# Patient Record
Sex: FEMALE | Race: WHITE | NOT HISPANIC OR LATINO | Employment: FULL TIME
[De-identification: names, ages, dates, MRNs, and addresses within clinical notes are randomized per-mention and may not be internally consistent; named-entity substitution may affect disease eponyms.]

---

## 2017-11-19 ENCOUNTER — HEALTH MAINTENANCE LETTER (OUTPATIENT)
Age: 22
End: 2017-11-19

## 2020-03-02 ENCOUNTER — HEALTH MAINTENANCE LETTER (OUTPATIENT)
Age: 25
End: 2020-03-02

## 2020-12-14 ENCOUNTER — HEALTH MAINTENANCE LETTER (OUTPATIENT)
Age: 25
End: 2020-12-14

## 2021-04-18 ENCOUNTER — HEALTH MAINTENANCE LETTER (OUTPATIENT)
Age: 26
End: 2021-04-18

## 2021-10-02 ENCOUNTER — HEALTH MAINTENANCE LETTER (OUTPATIENT)
Age: 26
End: 2021-10-02

## 2022-05-14 ENCOUNTER — HEALTH MAINTENANCE LETTER (OUTPATIENT)
Age: 27
End: 2022-05-14

## 2022-09-03 ENCOUNTER — HEALTH MAINTENANCE LETTER (OUTPATIENT)
Age: 27
End: 2022-09-03

## 2023-06-03 ENCOUNTER — HEALTH MAINTENANCE LETTER (OUTPATIENT)
Age: 28
End: 2023-06-03

## 2023-07-07 ENCOUNTER — TELEPHONE (OUTPATIENT)
Dept: BEHAVIORAL HEALTH | Facility: CLINIC | Age: 28
End: 2023-07-07
Payer: COMMERCIAL

## 2023-07-07 ENCOUNTER — TELEPHONE (OUTPATIENT)
Dept: BEHAVIORAL HEALTH | Facility: CLINIC | Age: 28
End: 2023-07-07

## 2023-07-07 ENCOUNTER — HOSPITAL ENCOUNTER (EMERGENCY)
Facility: CLINIC | Age: 28
Discharge: HOME | End: 2023-07-07
Attending: EMERGENCY MEDICINE | Admitting: STUDENT IN AN ORGANIZED HEALTH CARE EDUCATION/TRAINING PROGRAM
Payer: COMMERCIAL

## 2023-07-07 VITALS
DIASTOLIC BLOOD PRESSURE: 74 MMHG | OXYGEN SATURATION: 99 % | RESPIRATION RATE: 18 BRPM | SYSTOLIC BLOOD PRESSURE: 113 MMHG | TEMPERATURE: 98.7 F | HEART RATE: 75 BPM

## 2023-07-07 DIAGNOSIS — F41.0 GENERALIZED ANXIETY DISORDER WITH PANIC ATTACKS: ICD-10-CM

## 2023-07-07 DIAGNOSIS — F41.1 GENERALIZED ANXIETY DISORDER WITH PANIC ATTACKS: ICD-10-CM

## 2023-07-07 LAB
AMPHETAMINES UR QL SCN: ABNORMAL
BARBITURATES UR QL SCN: ABNORMAL
BENZODIAZ UR QL SCN: ABNORMAL
BZE UR QL SCN: ABNORMAL
CANNABINOIDS UR QL SCN: ABNORMAL
HCG UR QL: NEGATIVE
OPIATES UR QL SCN: ABNORMAL

## 2023-07-07 PROCEDURE — 99285 EMERGENCY DEPT VISIT HI MDM: CPT | Mod: 25 | Performed by: STUDENT IN AN ORGANIZED HEALTH CARE EDUCATION/TRAINING PROGRAM

## 2023-07-07 PROCEDURE — 99284 EMERGENCY DEPT VISIT MOD MDM: CPT | Performed by: STUDENT IN AN ORGANIZED HEALTH CARE EDUCATION/TRAINING PROGRAM

## 2023-07-07 PROCEDURE — 80307 DRUG TEST PRSMV CHEM ANLYZR: CPT | Performed by: STUDENT IN AN ORGANIZED HEALTH CARE EDUCATION/TRAINING PROGRAM

## 2023-07-07 PROCEDURE — 250N000013 HC RX MED GY IP 250 OP 250 PS 637: Performed by: STUDENT IN AN ORGANIZED HEALTH CARE EDUCATION/TRAINING PROGRAM

## 2023-07-07 PROCEDURE — 90791 PSYCH DIAGNOSTIC EVALUATION: CPT

## 2023-07-07 PROCEDURE — 81025 URINE PREGNANCY TEST: CPT | Performed by: STUDENT IN AN ORGANIZED HEALTH CARE EDUCATION/TRAINING PROGRAM

## 2023-07-07 RX ORDER — HYDROXYZINE HYDROCHLORIDE 25 MG/1
25 TABLET, FILM COATED ORAL 3 TIMES DAILY PRN
Qty: 42 TABLET | Refills: 0 | Status: SHIPPED | OUTPATIENT
Start: 2023-07-07 | End: 2023-07-21

## 2023-07-07 RX ORDER — HYDROXYZINE HYDROCHLORIDE 25 MG/1
25 TABLET, FILM COATED ORAL ONCE
Status: COMPLETED | OUTPATIENT
Start: 2023-07-07 | End: 2023-07-07

## 2023-07-07 RX ADMIN — HYDROXYZINE HYDROCHLORIDE 25 MG: 25 TABLET, FILM COATED ORAL at 11:14

## 2023-07-07 ASSESSMENT — COLUMBIA-SUICIDE SEVERITY RATING SCALE - C-SSRS
6. HAVE YOU EVER DONE ANYTHING, STARTED TO DO ANYTHING, OR PREPARED TO DO ANYTHING TO END YOUR LIFE?: NO
2. HAVE YOU ACTUALLY HAD ANY THOUGHTS OF KILLING YOURSELF?: NO
ATTEMPT LIFETIME: NO
TOTAL  NUMBER OF ABORTED OR SELF INTERRUPTED ATTEMPTS LIFETIME: NO
TOTAL  NUMBER OF INTERRUPTED ATTEMPTS LIFETIME: NO
1. HAVE YOU WISHED YOU WERE DEAD OR WISHED YOU COULD GO TO SLEEP AND NOT WAKE UP?: NO

## 2023-07-07 ASSESSMENT — ACTIVITIES OF DAILY LIVING (ADL)
ADLS_ACUITY_SCORE: 35
ADLS_ACUITY_SCORE: 33

## 2023-07-07 NOTE — TELEPHONE ENCOUNTER
Mental Health &Addiction (MH&A)Transition Clinic (TC):     Provides Patient Support While Waiting to Access Programmatic and Outpatient MH&A Care and Provides Select Crisis Assessment Services     NURSING Referral Review  _________________________________________    This RN has reviewed this Medication Management referral to the Transition Clinic and deemed the referral   [x] Appropriate x(Tier 1)  [] Inappropriate   []Consulting     Based on the following criteria:    Pt has a psychiatric provider (or pending plan) in place for future prescribing: Yes:   Next Level of Care Patient Will Be Transitioned To: telepsychiatry   Provider(s)Landry Echavarria  MSN  CNP,RN   Location [facility] [address], [address]   Date/Time 7/12/2023 3:00 pm        Timeframe until pt's scheduled psychiatry appointment is less than 6 months: Yes: 6 days     Pt takes psychiatric medications: Yes:   Current Medications       Escitalopram Oxalate (LEXAPRO PO) Take 15 mg by mouth daily  hydrOXYzine (ATARAX) 25 MG tablet Take 1 tablet (25 mg) by mouth 3 times daily as needed for anxiety      Pt's goals seem to align with this temporary service: Yes: Transition Clinic to bridge psychiatric care and psychiatric medication management until next Level of Care.         Any additional pertinent information regarding this referral: Patient was seen in the ED on 7/7/23. Per ED HPI. HPI  Camilo Anders is a 28 year old female with a past medical history of generalized anxiety disorder who presents to the Emergency Department with her sister seeking evaluation of a panic attack. Patient notes that she recently increased her Lexapro dosage from 10 to 15 mg at the end of April, and has decompensated since then.      The following report was obtained after having spoken to [facility] . Please see [facility] Crisis Assessment on July 7, 2023 in Epic for further details.     The only psych medication she has been on consistently is  Lexapro. She has been on antianxiety medications in the past, but has abused them, and so she no longer fills her Rx for them. Otherwise, she smokes marijuana on occasion. She is unsure of what has been triggering her panic attacks. She recently began seeing a therapist. She has had anxiety/depression ever since she was young. She is unsure of what has been triggering her panic attacks. She reports experiencing low motivation, sleeping excessively, being nauseated, and low appetite secondary to her mental health afflictions. She states that she feels like a burden to her boyfriend and roommate. Her roommate and boyfriend seems to lack a nuanced understanding of mental health. No SI/HI, psychosis, or SIB. Her sister has no safety concerns for her. The patient would be interested in outpatient follow up, as well as medication management. They are both agreeable with discharge at this time.     Initial contact w/ patient/parent: TC Coordinator to contact this patient/patients guardian to schedule a New Person Visit with TC Provider Tiara Echavarria. patient to prioritize next level of care appointment as it is in 6 days.    Additional Scheduling Instructions for Transition Clinic Coordinator:   TC Coordinators:  This is a Medication Management only Referral.        Please call the patient at [phone] (home). Please schedule a NewPerson appointment with TC Provider Tiara Echavarria as soon as possible or as indicated by the patient.       TC Coordinator, please inform this (patient/ parent/guardian/facility staff member) as to the purpose and benefit of the TC.      The Transition Clinic is a Temporary Service that helps to bridge the time to your next appointment.  It is not intended to be a long-term service and you are expected to attend your scheduled appointment with your next provider.      Patient/Parent/ Facility Staff Member verbalized understanding     If you need support between appointments, please call  [phone] and let them know you're seen by Transition Clinic Psychiatry.  You may also send a [employer] message to reach us.          RN Signature Vitaly Medina, RN on 7/7/2023 at 2:53 PM        FW: TC Referral  Received: Today  [facility] Transition Clinic Rn Pool  Hello,     Med only-     Next Level of Care Patient Will Be Transitioned To: telepsychiatry   Provider(s)Landry Echavarria  MSN  CNP,RN   Location [facility] [address], [address]   Date/Time 7/12/2023 3:00 pm       Thank you!           Previous Messages       ----- Message -----   From: Ethel Rivera   Sent: 7/7/2023  12:21 PM CDT   To: Transition Clinic   Subject: TC Referral                                       Transition Clinic Ref[place]   [place]         Please Check Type of Referral Requested:       ____THERAPY: The Transition clinic is able to schedule patients without current medical insurance; these patient will be referred to our Social Work Care Coordinator for Medical Insurance              Assistance. We are open for referral for psychotherapy. Patient is referred from:  Extended Care       __X__MEDICATION:  Referrals for Medication are ONLY accepted from the following areas (select): Emergency Department/Urgent Care - HIGH PRIORITY                                       Suboxone and Opioid Management Referrals are automatically denied. TC Psychiatry cannot see patient without active medical insurance.   TC Psychiatry cannot accept patient with next level of care scheduled with PCP       GUARDIAN: If your patient is not their own Guardian, please provide the following:     Guardian Name:   Guardian Contact Information (Phone & Email) :   Guardian Address:     FOSTER CARE PROVIDER: If your patient lives at a Licensed Foster Care, please provide the following:     Foster Provider:   Foster Provider Contact Information (Phone & Email):   Foster Provider Address:         Referring Provider  Contact Name: Darlene Khanna; Phone Number: [phone]     Reason for Transition Clinic Referral: Bridge until seen     Next Level of Care Patient Will Be Transitioned To: telepsychiatry   Provider(s)Landry Echavarria  MSN  CNP,RN   Location [facility] [address] [facility]   Date/Time 7/12/2023 3:00 pm     What Would Be Helpful from the Transition Clinic: Patient would like to be seen as soon as possible.      Needs: NO     Does Patient Have Access to Technology: YES     Patient E-mail Address: [email or web address]     Current Patient Phone Number: [phone]     Clinician Gender Preference (if applicable): YES: female     Patient location preference: [place]

## 2023-07-07 NOTE — DISCHARGE INSTRUCTIONS
[facility] SCHEDULING:  Today you were seen by a licensed mental health professional through [facility] and [facility] ([facility])  for a crisis assessment in the Emergency Department at [facility].  It is recommended that you follow up with your estabished providers (psychiatrist, mental health therapist, and/or primary care doctor - as relevant) as soon as possible. Coordinators from [facility] will be calling you in the next 24-48 hours to ensure that you have the resources you need.  You can also contact [facility] coordinators directly at [phone].    You have been scheduled the following appointments:  IN-PERSON ADULT MH EVAL with Camilo Cohen on 07/18/2023 at 1:00PM    VIRTUAL PSYCHIATRY  Landry Echavarria  MSN  CNP,RN    [facility] [address], [address] [phone]   7/12/2023 3:00 pm      [facility] maintains an extensive network of licensed behavioral health providers to connect patients with the services they need.  We do not charge providers a fee to participate in our referral network.  We match patients with providers based on a patient s specific needs, insurance coverage, and location.  Our first effort will be to refer you to a provider within your care system, and will utilize providers outside your care system as needed.     We are making a referral to the transition clinic for bridging telemedicine appointments (med management and/or therapy) until your scheduled appointment.  They will be reaching out to you via e-mail or phone in the coming days.  They can be reached at [phone].            Aftercare Plan    If I am feeling unsafe or I am in a crisis, I will:   Contact my established care providers   Call the National Suicide Prevention Lifeline: 988  Go to the nearest emergency room   Call 911     Warning signs that I or other people might notice when a crisis is developing for me: worsening depression or anxiety    Things I am able to do on  my own to cope or help me feel better: Grounding Techniques:  Try to notice where you are, your surroundings including the people, the sounds like the TV or radio.  Concentrate on your breathing. Take a deep cleansing breath from your diaphragm. Count the breaths as you exhale. Make sure you breath slowly.  Hold something that you find comforting, for some it may be a stuffed animal or a blanket. Notice how it feels in your hands. Is it hard or soft?  During a non-crisis time make a list of positive affirmations. Print them out and keep them handy for times of intense anxiety. At those times, read them aloud.  Try the TV2 Holding game:  Name 5 things you can see in the room with you  Name 4 things you can feel ( chair on my back  or  feet on floor )   Name 3 things you can hear right now ( people talking  or  tv )   Name 2 things you can smell right now (or, 2 things you like the smell of)   Name 1 good thing about yourself  Create A Safe Place  Image a safe place -- it can be a real or imaginary place:   What do you see -- especially colors?   What sounds do you hear?   What sensations do you feel?   What smells do you smell?   What people or animals would you want in your safe place?   Imagine a protective bubble, wall or boundary around your safe place.   Imagine a door or gate with a guard at your safe place.   Image a lock and key to your safe place and only you can unlock it.  You can draw or make a collage that represents your safe place.   Choose a souvenir of your safe place -- a color, an object, a song.   Keep your image of your safe place so you can come back to it when you need to.    Things that I am able to do with others to cope or help me better: let your support system know if you are needing more support     Things I can use or do for distraction: Reduce Extreme Emotion  QUICKLY:  Changing Your Body Chemistry      T:  Change your body Temperature to change your autonomic nervous system   Use Ice Water  to calm yourself down FAST   Put your face in a bowl of ice water (this is the best way; have the person keep his/her face in ice water for 30-45 seconds - initial research is showing that the longer s/he can hold her/his face in the water, the better the response), or   Splash ice water on your face, or hold an ice pack on your face      I:  Intensely exercise to calm down a body revved up by emotion   Examples: running, walking fast, jumping, playing basketball, weight lifting, swimming, calisthenics, etc.   Engage in exercises that DO NOT include violent behaviors. Exercises that utilize violent behaviors tend to function as  behavioral rehearsal,  and rather than calming the person down, may actually  rev  the person up more, increasing the likelihood of violence, and lessening the likelihood that they will  burn off  energy     P:  Progressively relax your muscles   Starting with your hands, moving to your forearms, upper arms, shoulders, neck, forehead, eyes, cheeks and lips, tongue and teeth, chest, upper back, stomach, buttocks, thighs, calves, ankles, feet   Tense (10 seconds,   of the way), then relax each muscle (all the way)   Notice the tension   Notice the difference when relaxed (by tensing first, and then relaxing, you are able to get a more thorough relaxation than by simply relaxing)     P: Paced breathing to relax   The standard technique is to begin with counting the number of steps one takes for a typical inhale, then counting the steps one takes for a typical exhale, and then lengthening the amount of steps for the exhalation by one or two steps.  OR  Repeat this pattern for 1-2 minutes  Inhale for four (4) seconds   Exhale for six (6) to eight (8) seconds   Research demonstrated that one can change one's overall level of anxiety by doing this exercise for even a few minutes per day     Changes I can make to support my mental health and wellness: begin medication management and attend  "diagnostic assessment to evaluate need for programmatic care    People in my life that I can ask for help: family, friends, boyfriend, individual therapist and OBGYN    Your [facility] has a mental health crisis team you can call 24/7: [facility] Mobile Crisis  [phone] (adults)  [phone] (children)    Other things that are important when I'm in crisis: continue taking medication as prescribed     Additional resources and information:     Crisis Lines  Crisis Text Line  Text 034699  You will be connected with a trained live crisis counselor to provide support.    Por espanol, texto  TINY a 451935 o texto a 442-AYUDAME en WhatsApp    The He Project (LGBTQ Youth Crisis Line)  2.686.072.3684  text START to 259-719      [employer]  Fast Tracker  Linking people to mental health and substance use disorder resources  fastDanceJam.MixVille     [place] Mental Health Warm Line  Peer to peer support  Monday thru Saturday, 12 pm to 10 pm  117.453.6241 or 1.528.886.9318  Text \"Support\" to 57719    National [place] on Mental Illness (CIARAN)  799.946.6176 or 1.888.CIARAN.HELPS      Mental Health Apps  My3  https://mySoft Health Technologiespp.org/    VirtualHopeBox  https://[employer].org/apps/virtual-hope-box/      Additional Information  Today you were seen by a licensed mental health professional through Triage and Transition services, [facility] (P)  for a crisis assessment in the Emergency Department at [facility].  It is recommended that you follow up with your established providers (psychiatrist, mental health therapist, and/or primary care doctor - as relevant) as soon as possible. Coordinators from [facility] will be calling you in the next 24-48 hours to ensure that you have the resources you need.  You can also contact [facility] coordinators directly at [phone]. You may have been scheduled for or offered an appointment with a mental health provider. [facility] maintains an extensive network of " licensed behavioral health providers to connect patients with the services they need.  We do not charge providers a fee to participate in our referral network.  We match patients with providers based on a patient's specific needs, insurance coverage, and location.  Our first effort will be to refer you to a provider within your care system, and will utilize providers outside your care system as needed.

## 2023-07-07 NOTE — CONSULTS
"Diagnostic Evaluation Consultation  Crisis Assessment    Patient was assessed: In Person  Patient location: [facility] Adult Emergency Department  Was a release of information signed: Yes, providers scheduled with below.      Referral Data and Chief Complaint  Patient is a 28 year old female presenting to the [facility] Adult Emergency Department for the following concerns: anxiety and depression.      Informed Consent and Assessment Methods     Patient is her own guardian. Writer met with patient and explained the crisis assessment process, including applicable information disclosures and limits to confidentiality, assessed understanding of the process, and obtained consent to proceed with the assessment. Patient was observed to be able to participate in the assessment as evidenced by verbal agreement. Assessment methods included conducting a formal interview with patient, review of medical records, collaboration with medical staff, and obtaining relevant collateral information from family and community providers when available..     Over the course of this crisis assessment provided reassurance, offered validation, engaged patient in problem solving and disposition planning, worked with patient on safety and aftercare planning, assisted in processing patient's thoughts and feeling relating to presenting concerns, provided psychoeducation and facilitated family communication. Patient's response to interventions was fully engaged.     Summary of Patient Situation     Patient cites wedding out of town 3 hours away in 4/2023 as initial trigger. Patient cannot identify why, but reports she woke up in panic during her 2 nights away and \"couldn't get a \". Patient reports she returned home early and had her Lexapro increased from 10mg to 15mg. Patient reports feeling better until she went out of town again last weekend. Patient does not know why going on vacation is triggering for her. Patient reports " "decompensating the past week as evidenced by low motivation, shaking, nausea, crying, excessive sleep and low appetite. Patient reports she feels scared of \"stupid things\", but cannot identify what she feels afraid of. Patient reports her symptoms are worse in the morning when she wakes up, then come and go throughout the day. Patient repots feeling down on herself for being so anxious, then worries she is a burden to those who love her. Patient reports trying to take little steps to help herseld but feels this is very hard. Patient is voluntarily seeking additional mental health supports in the ED this morning.    Brief Psychosocial History    Patient has lived with her roommate, who is also her best friend, for the past 3 years. Patient also has a boyfriend who she relies on for support. Patient reports her mother, father, and sister live far away so they are not as immediate supports. Patient's highest level of education was GED. Patient works as a  and supervisor at [employer]. Patient has no legal concerns.    Significant Clinical History     Patient has no history of civil commitment. Patient is her own legal guardian. Patient [facility]. Patient reports she recently started seeing a therapist but the pair have yet to get to know each other. Patient receives medication management from her OBGYN. Patient is compliant with medication regimen listed in Medication section below. Please see specific provider details listed in Current Care Team section below.       Collateral Information    Per patient's sister Liss who was present in our emergency department: patient has dealt with depression and anxiety since she was little. Patient is introverted. Patient does not like taking anxiety medication because she worries about its addictive nature. Patient relies on her best friend and boyfriend who do not understand mental " health, and tell patient she needs to feel better on her own. Patient recently started therapy which is progress because she has never been open to such services before. Patient has not made any suicidal or homicidal comments that her sister is aware of.     Risk Assessment  Hendrum Suicide Severity Rating Scale Full Clinical Version:  Suicidal Ideation  1. Wish to be Dead (Lifetime): (P) No  2. Non-Specific Active Suicidal Thoughts (Lifetime): (P) No     Suicidal Behavior  Actual Attempt (Lifetime): (P) No  Has subject engaged in non-suicidal self-injurious behavior? (Lifetime): (P) Yes  Has subject engaged in non-suicidal self-injurious behavior? (Past 3 Months): (P) No  Interrupted Attempts (Lifetime): (P) No  Aborted or Self-Interrupted Attempt (Lifetime): (P) No  Preparatory Acts or Behavior (Lifetime): (P) No  C-SSRS Risk (Lifetime/Recent)  Calculated C-SSRS Risk Score (Lifetime/Recent): (P) No Risk Indicated    Validity of evaluation is not impacted by presenting factors during interview.   Comments regarding subjective versus objective responses to Hendrum tool: congruent.  Environmental or Psychosocial Events: ongoing abuse of substances  Chronic Risk Factors: history of Non-Suicidal Self Injury (NSSI)   Warning Signs: anxiety, agitation, unable to sleep, sleeping all the time  Protective Factors: strong bond to family unit, community support, or employment, lives in a responsibly safe and stable environment, good treatment engagement, sense of importance of health and wellness, able to access care without barriers, supportive ongoing medical and mental health care relationships and help seeking  Interpretation of Risk Scoring, Risk Mitigation Interventions and Safety Plan:  Patient denies any current suicidal ideation, plan or intent. Patient denies any prior suicide attempts. Patient reports she is afraid of dying. Patient's sister similarly denies any concern for patient suicide at this time.     Does  the patient have thoughts of harming others? No     Is the patient engaging in sexually inappropriate behavior? No       Current Substance Abuse     Is there recent substance abuse? Patient endorses smoking marijuana a few times per week. Patient endorses history of abusing her anxiety medication, reports she no longer fills this prescription and is very concerned about avoiding prescription addictive medication in the future.     Was a urine drug screen or blood alcohol level obtained: Yes positive for marijuana.       Mental Status Exam     Affect: Appropriate   Appearance: Appropriate    Attention Span/Concentration: Attentive  Eye Contact: Engaged   Fund of Knowledge: Appropriate    Language /Speech Content: Fluent   Language /Speech Volume: Normal    Language /Speech Rate/Productions: Normal    Recent Memory: Intact   Remote Memory: Intact   Mood: Anxious and Depressed    Orientation to Person: Yes    Orientation to Place: Yes   Orientation to Time of Day: Yes    Orientation to Date: Yes    Situation (Do they understand why they are here?): Yes    Psychomotor Behavior: Normal    Thought Content: Clear   Thought Form: Intact      History of commitment: No       Medication    Psychotropic medications:   No current facility-administered medications for this encounter.     Current Outpatient Medications   Medication     Escitalopram Oxalate (LEXAPRO PO)     hydrOXYzine (ATARAX) 25 MG tablet     Medication changes made in the last two weeks: No; however, patient reports stopping birth control in 2/2023, and increasing her Lexapro from 10mg to 15mg in 4/2023.       Current Care Team    Primary Care Provider: KENDALL Kellogg at [facility]  Therapist: DEBORAH Dawn at [facility]     Diagnosis    1 Unspecified anxiety disorder F41.9       2 Unspecified depressive disorder F32.9       Clinical Summary and Substantiation of Recommendations    Patient is alert and oriented x4. Patient is  adequately dressed and groomed. Patient has articulate speech with normal rate and volume. Patient has anxious and depressed mood. Patient was tearful throughout assessment. Patient was cooperative with the assessment process. Patient cites primary stressor(s) as recent travel, unknown why this has been triggering her. Patient also believe symptoms could be due to discontinuing birth control in 2/2023. Patient reports decompensating the past week as evidenced by low motivation, shaking, nausea, crying, excessive sleep and low appetite. Patient reports she is still able to complete her ADLs. Patient denies any current NSSI, SI, HI, AH or VH. Patient endorses smoking marijuana a few times per week. Patient was scheduled for medication management and diagnostic assessment to evaluate need for programmatic care. Patient was also given Hydroxyzine PRN by attending physician. Patient feels safe with discharge, reports her sister will stay with her to make sure she is feeling better.     Disposition    Recommended disposition: Individual Therapy, Medication Management and Programmatic Care.     Reviewed case and recommendations with attending provider. Attending Name: Dr. Emili العراقي       Attending concurs with disposition: Yes       Patient and/or validated legal guardian concurs with disposition: Yes       Final disposition: Patient was scheduled for diagnostic assessment to evaluate need for programmatic care, medication management, and given Hydroxyzine PRN. Patient to follow up with her established individual therapist.    Outpatient Details (if applicable):   Aftercare plan and appointments placed in the AVS and provided to patient: Yes    Was lethal means counseling provided as a part of aftercare planning? No, patient denied any suicidal ideation, plan or intent.       Assessment Details    Patient interview started at: 11:15am and completed at: 11:45am.     Total time spent with the patient or their family: .50  hrs      CPT code(s) utilized: 42172 - Psychotherapy for Crisis - 60 (30-74*) min       FABRICE Cabrera, [facility], Psychotherapist  DEC - Triage & Transition Services  Callback: [phone]    [facility] SCHEDULING:  Today you were seen by a licensed [facility] health professional [facility] ([facility])  for a crisis assessment in the Emergency Department at [facility].  It is recommended that you follow up with your estabished providers (psychiatrist, mental health therapist, and/or primary care doctor - as relevant) as soon as possible. Coordinators from [facility] will be calling you in the next 24-48 hours to ensure that you have the resources you need.  You can also contact [facility] coordinators directly at [phone].    You have been scheduled the following appointments:  IN-PERSON ADULT MH EVAL with Camilo Cohen on 07/18/2023 at 1:00PM    VIRTUAL PSYCHIATRY  Landry BUSH  CNP,RN    [facility] [address], [address] [phone]   7/12/2023 3:00 pm      [facility] maintains an extensive network of licensed behavioral health providers to connect patients with the services they need.  We do not charge providers a fee to participate in our referral network.  We match patients with providers based on a patient s specific needs, insurance coverage, and location.  Our first effort will be to refer you to a provider within your care system, and will utilize providers outside your care system as needed.     We are making a referral to the transition clinic for bridging telemedicine appointments (med management and/or therapy) until your scheduled appointment.  They will be reaching out to you via e-mail or phone in the coming days.  They can be reached at [phone].      Aftercare Plan    If I am feeling unsafe or I am in a crisis, I will:   Contact my established care providers   Call the National Suicide Prevention Lifeline: 981  Go to  the nearest emergency room   Call 911     Warning signs that I or other people might notice when a crisis is developing for me: worsening depression or anxiety    Things I am able to do on my own to cope or help me feel better: Grounding Techniques:    Try to notice where you are, your surroundings including the people, the sounds like the TV or radio.    Concentrate on your breathing. Take a deep cleansing breath from your diaphragm. Count the breaths as you exhale. Make sure you breath slowly.    Hold something that you find comforting, for some it may be a stuffed animal or a blanket. Notice how it feels in your hands. Is it hard or soft?    During a non-crisis time make a list of positive affirmations. Print them out and keep them handy for times of intense anxiety. At those times, read them aloud.  Try the Sportube game:    Name 5 things you can see in the room with you    Name 4 things you can feel ( chair on my back  or  feet on floor )     Name 3 things you can hear right now ( people talking  or  tv )     Name 2 things you can smell right now (or, 2 things you like the smell of)     Name 1 good thing about yourself  Create A Safe Place    Image a safe place -- it can be a real or imaginary place:     What do you see -- especially colors?     What sounds do you hear?     What sensations do you feel?     What smells do you smell?     What people or animals would you want in your safe place?     Imagine a protective bubble, wall or boundary around your safe place.     Imagine a door or gate with a guard at your safe place.     Image a lock and key to your safe place and only you can unlock it.    You can draw or make a collage that represents your safe place.     Choose a souvenir of your safe place -- a color, an object, a song.     Keep your image of your safe place so you can come back to it when you need to.    Things that I am able to do with others to cope or help me better: let your support system know if  you are needing more support     Things I can use or do for distraction: Reduce Extreme Emotion  QUICKLY:  Changing Your Body Chemistry      T:  Change your body Temperature to change your autonomic nervous system   ? Use Ice Water to calm yourself down FAST   ? Put your face in a bowl of ice water (this is the best way; have the person keep his/her face in ice water for 30-45 seconds - initial research is showing that the longer s/he can hold her/his face in the water, the better the response), or   ? Splash ice water on your face, or hold an ice pack on your face      I:  Intensely exercise to calm down a body revved up by emotion   ? Examples: running, walking fast, jumping, playing basketball, weight lifting, swimming, calisthenics, etc.   ? Engage in exercises that DO NOT include violent behaviors. Exercises that utilize violent behaviors tend to function as  behavioral rehearsal,  and rather than calming the person down, may actually  rev  the person up more, increasing the likelihood of violence, and lessening the likelihood that they will  burn off  energy     P:  Progressively relax your muscles   ? Starting with your hands, moving to your forearms, upper arms, shoulders, neck, forehead, eyes, cheeks and lips, tongue and teeth, chest, upper back, stomach, buttocks, thighs, calves, ankles, feet   ? Tense (10 seconds,   of the way), then relax each muscle (all the way)   ? Notice the tension   ? Notice the difference when relaxed (by tensing first, and then relaxing, you are able to get a more thorough relaxation than by simply relaxing)     P: Paced breathing to relax   ? The standard technique is to begin with counting the number of steps one takes for a typical inhale, then counting the steps one takes for a typical exhale, and then lengthening the amount of steps for the exhalation by one or two steps.  OR  ? Repeat this pattern for 1-2 minutes  ? Inhale for four (4) seconds   ? Exhale for six (6) to  "eight (8) seconds   ? Research demonstrated that one can change one's overall level of anxiety by doing this exercise for even a few minutes per day     Changes I can make to support my mental health and wellness: begin medication management and attend diagnostic assessment to evaluate need for programmatic care    People in my life that I can ask for help: family, friends, boyfriend, individual therapist and OBGYN    Your [facility] has a mental health crisis team you can call 24/7: [facility] Mobile Crisis  [phone] (adults)  [phone] (children)    Other things that are important when I'm in crisis: continue taking medication as prescribed     Additional resources and information:     Crisis Lines  Crisis Text Line  Text 226485  You will be connected with a trained live crisis counselor to provide support.    Por carter, texto  TINY a 954146 o texto a 442-AYUDAME en WhatsApp    The He Project (LGBTQ Youth Crisis Line)  6.373.708.3717  text START to 151-850      [employer]  Fast Tracker  Linking people to mental health and substance use disorder resources  [employer]     [place] Mental Health Warm Line  Peer to peer support  Monday thru Saturday, 12 pm to 10 pm  382.883.7887 or 4.758.634.8789  Text \"Support\" to 08192    National [place] on Mental Illness (CIARAN)  407.831.8976 or 1.888.CIARAN.HELPS      Mental Health Apps  My3  https://myHard 8 Gamespp.org/    VirtualHopeBox  https://[employer].org/apps/virtual-hope-box/      Additional Information  Today you were seen by a licensed mental health professional through Triage and Transition services, [facility] (P)  for a crisis assessment in the Emergency Department at [facility].  It is recommended that you follow up with your established providers (psychiatrist, mental health therapist, and/or primary care doctor - as relevant) as soon as possible. Coordinators from [facility] will be calling you in the next " 24-48 hours to ensure that you have the resources you need.  You can also contact [facility] coordinators directly at [phone]. You may have been scheduled for or offered an appointment with a mental health provider. [facility] maintains an extensive network of licensed behavioral health providers to connect patients with the services they need.  We do not charge providers a fee to participate in our referral network.  We match patients with providers based on a patient's specific needs, insurance coverage, and location.  Our first effort will be to refer you to a provider within your care system, and will utilize providers outside your care system as needed.

## 2023-07-07 NOTE — TELEPHONE ENCOUNTER
Writer has fwd medication management referral only to TC RN pool as next level of care set and replied to referral source. Will wait to hear if referral is appropriate or inappropriate.    Queta Mccormick  07/07/2023  1228    ----- Message from Ethel Rivera sent at 7/7/2023 12:14 PM CDT -----  Regarding: TC Referral  Transition Clinic Referral   [place]         Please Check Type of Referral Requested:       ____THERAPY: The Transition clinic is able to schedule patients without current medical insurance; these patient will be referred to our Social Work Care Coordinator for Medical Insurance              Assistance. We are open for referral for psychotherapy. Patient is referred from:  Extended Care      __X__MEDICATION:  Referrals for Medication are ONLY accepted from the following areas (select): Emergency Department/Urgent Care - HIGH PRIORITY                                       Suboxone and Opioid Management Referrals are automatically denied. [facility] Psychiatry cannot see patient without active medical insurance.   TC Psychiatry cannot accept patient with next level of care scheduled with PCP      GUARDIAN: If your patient is not their own Guardian, please provide the following:    Guardian Name:  Guardian Contact Information (Phone & Email) :  Guardian Address:     FOSTER CARE PROVIDER: If your patient lives at a Licensed Foster Care, please provide the following:    Foster Provider:  Foster Provider Contact Information (Phone & Email):  Foster Provider Address:         Referring Provider Contact Name: Darlene Khanna; Phone Number: [phone]    Reason for Transition Clinic Referral: Bridge until seen    Next Level of Care Patient Will Be Transitioned To: telepsychiatry  Provider(s)Landry BUSH  CNP,RN   Location [facility] [address], [address]  Date/Time 7/12/2023 3:00 pm    What Would Be Helpful from the Transition Clinic: Patient would like to be seen as  soon as possible.      Needs: NO    Does Patient Have Access to Technology: YES    Patient E-mail Address: [email or web address]    Current Patient Phone Number: [phone]    Clinician Gender Preference (if applicable): YES: female    Patient location preference: [place]

## 2023-07-07 NOTE — TELEPHONE ENCOUNTER
S:Provider from [facility] calling about a patient they will be sending to ED.      B:Pt has hx of panic attacks and depression and this provider reports that pt is currently prescribed 15 mg daily of Escitalopram.    A:Pt is disregulated and has reported she does not feel safe and needs further support.    R: Pt en route to ED, Please assess for inpatient MH.

## 2023-07-07 NOTE — ED TRIAGE NOTES
Reports increase in lexapro at the end of April which was helpful. Now patient is experiencing similar troubles functioning day to day

## 2023-07-07 NOTE — TELEPHONE ENCOUNTER
Writer spoke with patient who stated they felt comfortable waiting for long term care for therapy and psychiatry. Patient declined crisis phone numbers as patient stated she had some. Referral marked as complete. Tracker completed.    Queta Mccormick  07/07/2023  318    ----- Message from Vitaly Medina RN sent at 7/7/2023  2:58 PM CDT -----  Regarding: FW: ADY Referral    ----- Message -----  From: Vitaly Medina RN  Sent: 7/7/2023   2:58 PM CDT  To: Transition Clinic Rn Pool  Subject: FW: ADY Referral                                   Mental Health &Addiction (MH&A)Transition Clinic (TC):     Provides Patient Support While Waiting to Access Programmatic and Outpatient MH&A Care and Provides Select Crisis Assessment Services     NURSING Referral Review  _________________________________________    This RN has reviewed this Medication Management referral to the Transition Clinic and deemed the referral    Appropriate x(Tier 1)   Inappropriate   Consulting     Based on the following criteria:    Pt has a psychiatric provider (or pending plan) in place for future prescribing: Yes:   Next Level of Care Patient Will Be Transitioned To: telepsychiatry   Provider(s)Landry BUSH  CNP,RN   Location [facility] [address], [address]   Date/Time 7/12/2023 3:00 pm        Timeframe until pt's scheduled psychiatry appointment is less than 6 months: Yes: 6 days     Pt takes psychiatric medications: Yes:   Current Medications       Escitalopram Oxalate (LEXAPRO PO) Take 15 mg by mouth daily  hydrOXYzine (ATARAX) 25 MG tablet Take 1 tablet (25 mg) by mouth 3 times daily as needed for anxiety      Pt's goals seem to align with this temporary service: Yes: Transition Clinic to bridge psychiatric care and psychiatric medication management until next Level of Care.         Any additional pertinent information regarding this referral: Patient was seen in the ED on 7/7/23. Per ED HPI. HPI  Camilo  Martita is a 28 year old female with a past medical history of generalized anxiety disorder who presents to the Emergency Department with her sister seeking evaluation of a panic attack. Patient notes that she recently increased her Lexapro dosage from 10 to 15 mg at the end of April, and has decompensated since then.      The following report was obtained after having spoken to [facility] . Please see [facility] Crisis Assessment on July 7, 2023 in Epic for further details.     The only psych medication she has been on consistently is Lexapro. She has been on antianxiety medications in the past, but has abused them, and so she no longer fills her Rx for them. Otherwise, she smokes marijuana on occasion. She is unsure of what has been triggering her panic attacks. She recently began seeing a therapist. She has had anxiety/depression ever since she was young. She is unsure of what has been triggering her panic attacks. She reports experiencing low motivation, sleeping excessively, being nauseated, and low appetite secondary to her mental health afflictions. She states that she feels like a burden to her boyfriend and roommate. Her roommate and boyfriend seems to lack a nuanced understanding of mental health. No SI/HI, psychosis, or SIB. Her sister has no safety concerns for her. The patient would be interested in outpatient follow up, as well as medication management. They are both agreeable with discharge at this time.     Initial contact w/ patient/parent: TC Coordinator to contact this patient/patients guardian to schedule a New Person Visit with TC Provider Tiara Echavarria. patient to prioritize next level of care appointment as it is in 6 days.    Additional Scheduling Instructions for Transition Clinic Coordinator:   TC Coordinators:  This is a Medication Management only Referral.        Please call the patient at [phone] (home). Please schedule a NewPerson appointment with TC Provider Tiara Echavarria as soon as  possible or as indicated by the patient.       TC Coordinator, please inform this (patient/ parent/guardian/facility staff member) as to the purpose and benefit of the TC.      The Transition Clinic is a Temporary Service that helps to bridge the time to your next appointment.  It is not intended to be a long-term service and you are expected to attend your scheduled appointment with your next provider.      Patient/Parent/ Facility Staff Member verbalized understanding     If you need support between appointments, please call [phone] and let them know you're seen by Transition Clinic Psychiatry.  You may also send a Heyy message to reach us.          RN Signature Vitaly Medina RN on 7/7/2023 at 2:53 PM        FW: TC Referral  Received: Today  Queta Mccormick Transition Clinic Rn Pool  Hello,     Med only-     Next Level of Care Patient Will Be Transitioned To: telepsychiatry   Provider(s)Landry Echavarria  MSN  CNP,RN   Location [facility] [address]   Date/Time 7/12/2023 3:00 pm       Thank you!         Previous Messages       ----- Message -----   From: Ethel Rivera   Sent: 7/7/2023  12:21 PM CDT   To: Transition Clinic   Subject: TC Referral                                       Transition Clinic Ref[place]   [place]         Please Check Type of Referral Requested:       ____THERAPY: The Transition clinic is able to schedule patients without current medical insurance; these patient will be referred to our Social Work Care Coordinator for Medical Insurance              Assistance. We are open for referral for psychotherapy. Patient is referred from:  Extended Care       __X__MEDICATION:  Referrals for Medication are ONLY accepted from the following areas (select): Emergency Department/Urgent Care - HIGH PRIORITY                                       Suboxone and Opioid Management Referrals are automatically denied. TC Psychiatry cannot see patient  without active medical insurance.   TC Psychiatry cannot accept patient with next level of care scheduled with PCP       GUARDIAN: If your patient is not their own Guardian, please provide the following:     Guardian Name:   Guardian Contact Information (Phone & Email) :   Guardian Address:     FOSTER CARE PROVIDER: If your patient lives at a Licensed Foster Care, please provide the following:     Foster Provider:   Foster Provider Contact Information (Phone & Email):   Foster Provider Address:         Referring Provider Contact Name: Darlene Khanna; Phone Number: [phone]     Reason for Transition Clinic Referral: Bridge until seen     Next Level of Care Patient Will Be Transitioned To: telepsychiatry   Provider(s)Landry BUSH  CNP,RN   Location [facility] [address], [address]   Date/Time 7/12/2023 3:00 pm     What Would Be Helpful from the Transition Clinic: Patient would like to be seen as soon as possible.      Needs: NO     Does Patient Have Access to Technology: YES     Patient E-mail Address: [email or web address]     Current Patient Phone Number: [phone]     Clinician Gender Preference (if applicable): YES: female     Patient location preference: [place]                              ----- Message -----  From: Queta Mccormick  Sent: 7/7/2023  12:28 PM CDT  To: Transition Clinic Rn Matty  Subject: FW: TC Referral                                  Hello,    Med only-    Next Level of Care Patient Will Be Transitioned To: telepsychiatry   Provider(s)Landry BUSH CNP,RN   [facility] [address]   Date/Time 7/12/2023 3:00 pm       Thank you!  ----- Message -----  From: Ethel Rivera  Sent: 7/7/2023  12:21 PM CDT  To: Transition Clinic  Subject: TC Referral                                      Transition Clinic Referral   [place]         Please Check Type of  Referral Requested:       ____THERAPY: The Transition clinic is able to schedule patients without current medical insurance; these patient will be referred to our Social Work Care Coordinator for Medical Insurance              Assistance. We are open for referral for psychotherapy. Patient is referred from:  Extended Care      __X__MEDICATION:  Referrals for Medication are ONLY accepted from the following areas (select): Emergency Department/Urgent Care - HIGH PRIORITY                                       Suboxone and Opioid Management Referrals are automatically denied. TC Psychiatry cannot see patient without active medical insurance.   TC Psychiatry cannot accept patient with next level of care scheduled with PCP      GUARDIAN: If your patient is not their own Guardian, please provide the following:    Guardian Name:  Guardian Contact Information (Phone & Email) :  Guardian Address:     FOSTER CARE PROVIDER: If your patient lives at a Licensed Foster Care, please provide the following:    Foster Provider:  Foster Provider Contact Information (Phone & Email):  Foster Provider Address:         Referring Provider Contact Name: Darlene Khanna; Phone Number: [phone]    Reason for Transition Clinic Referral: Bridge until seen    Next Level of Care Patient Will Be Transitioned To: telepsychiatry  Provider(s)Landry Echavarria  MSN  CNP,RN   Location [facility] [address], [address]  Date/Time 7/12/2023 3:00 pm    What Would Be Helpful from the Transition Clinic: Patient would like to be seen as soon as possible.      Needs: NO    Does Patient Have Access to Technology: YES    Patient E-mail Address: [email or web address]    Current Patient Phone Number: [phone]    Clinician Gender Preference (if applicable): YES: female    Patient location preference: [place]

## 2023-07-07 NOTE — ED PROVIDER NOTES
ED Provider Note  [facility]      History     Chief Complaint   Patient presents with     Panic Attack     Reports increase in lexapro at the end of April which was helpful. Now patient is experiencing similar troubles functioning day to day.      HPI  Camilo Anders is a 28 year old female with a past medical history of generalized anxiety disorder who presents to the Emergency Department with her sister seeking evaluation of a panic attack. Patient notes that she recently increased her Lexapro dosage from 10 to 15 mg at the end of April, and has decompensated since then.     The following report was obtained after having spoken to [facility] . Please see [facility] Crisis Assessment on July 7, 2023 in Epic for further details.    The only psych medication she has been on consistently is Lexapro. She has been on antianxiety medications in the past, but has abused them, and so she no longer fills her Rx for them. Otherwise, she smokes marijuana on occasion. She is unsure of what has been triggering her panic attacks. She recently began seeing a therapist. She has had anxiety/depression ever since she was young. She is unsure of what has been triggering her panic attacks. She reports experiencing low motivation, sleeping excessively, being nauseated, and low appetite secondary to her mental health afflictions. She states that she feels like a burden to her boyfriend and roommate. Her roommate and boyfriend seems to lack a nuanced understanding of mental health. No SI/HI, psychosis, or SIB. Her sister has no safety concerns for her. The patient would be interested in outpatient follow up, as well as medication management. They are both agreeable with discharge at this time.       Past Medical History  Past Medical History:   Diagnosis Date     Anxiety      Depressive disorder      Past Surgical History:   Procedure Laterality Date     ORTHOPEDIC SURGERY       Escitalopram Oxalate (LEXAPRO  PO)  hydrOXYzine (ATARAX) 25 MG tablet      No Known Allergies  Family History  History reviewed. No pertinent family history.  Social History   Social History     Tobacco Use     Smoking status: Former     Packs/day: 0.25     Types: Cigarettes   Substance Use Topics     Alcohol use: Yes     Drug use: Yes     Types: Marijuana      Past medical history, past surgical history, medications, allergies, family history, and social history were reviewed with the patient. No additional pertinent items.      A medically appropriate review of systems was performed with pertinent positives and negatives noted in the HPI, and all other systems negative.    Physical Exam   BP: 113/74  Pulse: 75  Temp: 98.7  F (37.1  C)  Resp: 18  SpO2: 99 %  Physical Exam  GEN: Well appearing, non toxic, cooperative  HEENT: normocephalic and atraumatic, PERRLA, EOMI  CV: well-perfused, normal skin color for ethnicity  PULM: breathing comfortably, in no respiratory distress  ABD: nondistended  EXT: Full range of motion.  No edema.  NEURO: awake, conversant, grossly normal bilateral upper and lower extremity strength & ROM   SKIN: No rashes, ecchymosis, or lacerations  PSYCH: Tearful, but cooperative, interactive      ED Course, Procedures, & Data      Procedures     Mental Health Risk Assessment      PSS-3    Date and Time Over the past 2 weeks have you felt down, depressed, or hopeless? Over the past 2 weeks have you had thoughts of killing yourself? Have you ever attempted to kill yourself? When did this last happen? User   07/07/23 1033 yes no yes more than 6 months ago [facility]              Suicide assessment completed by mental health (D.E.C., LCSW, etc.)       Results for orders placed or performed during the hospital encounter of 07/07/23   HCG qualitative urine     Status: Normal   Result Value Ref Range    hCG Urine Qualitative Negative Negative   Drug abuse screen 1 urine (ED)     Status: Abnormal   Result Value Ref Range    Amphetamines  Urine Screen Negative Screen Negative    Barbituates Urine Screen Negative Screen Negative    Benzodiazepine Urine Screen Negative Screen Negative    Cannabinoids Urine Screen Positive (A) Screen Negative    Cocaine Urine Screen Negative Screen Negative    Opiates Urine Screen Negative Screen Negative   Urine Drugs of Abuse Screen     Status: Abnormal    Narrative    The following orders were created for panel order Urine Drugs of Abuse Screen.  Procedure                               Abnormality         Status                     ---------                               -----------         ------                     Drug abuse screen 1 urin...[[id number]]  Abnormal            Final result                 Please view results for these tests on the individual orders.     Medications   hydrOXYzine (ATARAX) tablet 25 mg (25 mg Oral $Given 7/7/23 1117)     Labs Ordered and Resulted from Time of ED Arrival to Time of ED Departure   DRUG ABUSE SCREEN 1 URINE (ED) - Abnormal       Result Value    Amphetamines Urine Screen Negative      Barbituates Urine Screen Negative      Benzodiazepine Urine Screen Negative      Cannabinoids Urine Screen Positive (*)     Cocaine Urine Screen Negative      Opiates Urine Screen Negative     HCG QUALITATIVE URINE - Normal    hCG Urine Qualitative Negative       No orders to display          Critical care was not performed.     Medical Decision Making  The patient's presentation was of moderate complexity (a chronic illness mild to moderate exacerbation, progression, or side effect of treatment).    The patient's evaluation involved:  review of external note(s) from 3+ sources (see separate area of note for details)  ordering and/or review of 2 test(s) in this encounter (see separate area of note for details)  review of 3+ test result(s) ordered prior to this encounter (see separate area of note for details)    The patient's management necessitated moderate risk (prescription drug management  including medications given in the ED).      Assessment & Plan    28-year-old female with a history of generalized anxiety disorder with panic attacks presents emergency department due to increasing panic attacks and feeling as though she is unable to function properly due to them.  Endorses significant anxiety and some depression but denies any thoughts of wanting to harm herself or others.  No thoughts of suicide.    Evaluated by [facility], and they were able to give her resources.  She feels safe to be able to be discharged at this time.    I have reviewed the nursing notes. I have reviewed the findings, diagnosis, plan and need for follow up with the patient.    New Prescriptions    HYDROXYZINE (ATARAX) 25 MG TABLET    Take 1 tablet (25 mg) by mouth 3 times daily as needed for anxiety       Final diagnoses:   Generalized anxiety disorder with panic attacks   I, Abebe Javed, am serving as a trained medical scribe to document services personally performed by Emili العراقي MD, based on the provider's statements to me.     I, Emili العراقي MD, was physically present and have reviewed and verified the accuracy of this note documented by Abebe Javed.      Emili العراقي MD  [facility] EMERGENCY DEPARTMENT  7/7/2023     Emili العراقي MD  07/07/23 4572

## 2023-09-12 ENCOUNTER — TELEPHONE (OUTPATIENT)
Dept: BEHAVIORAL HEALTH | Facility: CLINIC | Age: 28
End: 2023-09-12
Payer: COMMERCIAL

## 2023-09-12 NOTE — TELEPHONE ENCOUNTER
Pt is a(n) adult (18+ out of HS) Seeking as eval for Adult Mental Health DA for Programmatic Care - Program Preference? No.  Appointment scheduled by:  Patient.  (self-pay - complete Cost Estimate)       needed?  NO    Contact information verified/updated: Yes    Delicia Galindo

## 2023-09-15 ENCOUNTER — TELEPHONE (OUTPATIENT)
Dept: BEHAVIORAL HEALTH | Facility: CLINIC | Age: 28
End: 2023-09-15

## 2023-12-29 ENCOUNTER — HOSPITAL ENCOUNTER (EMERGENCY)
Facility: CLINIC | Age: 28
End: 2023-12-29
Payer: COMMERCIAL

## 2023-12-29 ENCOUNTER — NURSE TRIAGE (OUTPATIENT)
Dept: NURSING | Facility: CLINIC | Age: 28
End: 2023-12-29

## 2023-12-29 NOTE — ED NOTES
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  Can be associated with CNS vasculitis and patient has had headaches.  Recommend MRI and neurology consult.    Recommendations provided:  No neurology at [facility], consider alternative location    Caller was informed that this institution does not possess the capabilities and/or resources to provide for patient and should be transferred to a different facility due to lack of neurology .    Discussed that if direct admit is sought and any hurdles are encountered, this ED would be happy to see the patient and evaluate.    Informed caller that recommendations provided are recommendations based only on the facts provided and that they responsible to accept or reject the advice, or to seek a formal in person consultation as needed and that this ED will see/treat patient should they arrive.      Javier Pérez MD  [facility] EMERGENCY ROOM  [address] [place] [address]  [phone]     Javier Pérez MD  12/29/23 2381

## 2023-12-29 NOTE — TELEPHONE ENCOUNTER
Nurse Triage SBAR    Is this a 2nd Level Triage? NO    Situation: Patient was seen in the Optometrists office and was told to be seen in ER today  Consent: not needed    Background: Patient reports that she was seen in the Optometrists office today and was told to be seen in the ER based on swelling in her retina     Assessment:   crying and having a bad headache and affecting her vision  Went to the eye clinic today and had an exam- indicates she was told that she has inflammation in her retina   Over all not feeling well  Indicates she was told to go to the ER   Noticed changes in her vision- black spots that don't go away  Notes she is getting over a virus where she has been having headaches  Notes she has been feeling ill for a couple days - Tuesday evening- prior to Tuesday evening was not having black spots   Headache is improved currently- taking advil helped     Protocol Recommended Disposition:       Recommendation: Advised to follow the advise of the Optometrist and be seen in the ER for concerns raised on exam. Patient very tearful on the phone. Writer provided support and encouragement to be seen. Patient indicates she will go to [facility] ER now. Declines additional questions.      ER now    Does the patient meet one of the following criteria for ADS visit consideration? No      Enid Roberts RN 1:21 PM 12/29/2023  Reason for Disposition   Nursing judgment     As was advised by her Optometrist    Additional Information   Negative: Nursing judgment   Negative: Information only call; adult is not ill or injured    Protocols used: No Guideline or Reference [id number]

## 2024-07-06 ENCOUNTER — HEALTH MAINTENANCE LETTER (OUTPATIENT)
Age: 29
End: 2024-07-06

## 2024-09-13 ENCOUNTER — HOSPITAL ENCOUNTER (EMERGENCY)
Facility: CLINIC | Age: 29
Discharge: HOME | End: 2024-09-13
Attending: PSYCHIATRY & NEUROLOGY | Admitting: PSYCHIATRY & NEUROLOGY
Payer: COMMERCIAL

## 2024-09-13 VITALS
DIASTOLIC BLOOD PRESSURE: 83 MMHG | HEART RATE: 73 BPM | RESPIRATION RATE: 18 BRPM | TEMPERATURE: 97.9 F | OXYGEN SATURATION: 98 % | SYSTOLIC BLOOD PRESSURE: 126 MMHG

## 2024-09-13 DIAGNOSIS — F41.1 GAD (GENERALIZED ANXIETY DISORDER): ICD-10-CM

## 2024-09-13 PROCEDURE — 99284 EMERGENCY DEPT VISIT MOD MDM: CPT | Performed by: PSYCHIATRY & NEUROLOGY

## 2024-09-13 PROCEDURE — 99285 EMERGENCY DEPT VISIT HI MDM: CPT | Performed by: PSYCHIATRY & NEUROLOGY

## 2024-09-13 RX ORDER — OLANZAPINE 5 MG/1
5 TABLET, ORALLY DISINTEGRATING ORAL ONCE
Status: COMPLETED | OUTPATIENT
Start: 2024-09-13 | End: 2024-09-13

## 2024-09-13 ASSESSMENT — COLUMBIA-SUICIDE SEVERITY RATING SCALE - C-SSRS
5. HAVE YOU STARTED TO WORK OUT OR WORKED OUT THE DETAILS OF HOW TO KILL YOURSELF? DO YOU INTEND TO CARRY OUT THIS PLAN?: NO
6. HAVE YOU EVER DONE ANYTHING, STARTED TO DO ANYTHING, OR PREPARED TO DO ANYTHING TO END YOUR LIFE?: NO
4. HAVE YOU HAD THESE THOUGHTS AND HAD SOME INTENTION OF ACTING ON THEM?: YES
2. HAVE YOU ACTUALLY HAD ANY THOUGHTS OF KILLING YOURSELF IN THE PAST MONTH?: YES
1. IN THE PAST MONTH, HAVE YOU WISHED YOU WERE DEAD OR WISHED YOU COULD GO TO SLEEP AND NOT WAKE UP?: YES
3. HAVE YOU BEEN THINKING ABOUT HOW YOU MIGHT KILL YOURSELF?: NO

## 2024-09-13 ASSESSMENT — ACTIVITIES OF DAILY LIVING (ADL)
ADLS_ACUITY_SCORE: 35
ADLS_ACUITY_SCORE: 35

## 2024-09-13 NOTE — ED TRIAGE NOTES
Anxiety/panic disorder for a year and a half, currently feeling overwhelmed, panic, having thoughts of suicide. No plan      Triage Assessment (Adult)       Row Name 09/13/24 143          Triage Assessment    Airway WDL WDL        Respiratory WDL    Respiratory WDL WDL        Skin Circulation/Temperature WDL    Skin Circulation/Temperature WDL WDL        Cardiac WDL    Cardiac WDL WDL        Peripheral/Neurovascular WDL    Peripheral Neurovascular WDL WDL        Cognitive/Neuro/Behavioral WDL    Cognitive/Neuro/Behavioral WDL WDL

## 2024-09-13 NOTE — DISCHARGE INSTRUCTIONS
Please follow-up with your established care services for ongoing support.  If you feel unsafe, please return to the ED.

## 2024-09-13 NOTE — ED PROVIDER NOTES
ED Provider Note  [facility]      History     Chief Complaint   Patient presents with    Suicidal      1 1/2years of anxiety disorder, now feeling suicidal.       HPI  Camilo Anders is a 29 year old female with a history of ILAN and depression who presents to the emergency department with a mental health concern.She is accompanied by sister. She reports unrelenting anxiety past several days. She does not feel her current meds are helping. She comes here seeking relief. She is not suicidal.     Patient has history of benzo abuse. She is interested in alternatives. She is willing to try a dose of olanzapine 5 mg.    Past Medical History  Past Medical History:   Diagnosis Date    Anxiety     Depressive disorder      Past Surgical History:   Procedure Laterality Date    ORTHOPEDIC SURGERY       Escitalopram Oxalate (LEXAPRO PO)      No Known Allergies  Family History  No family history on file.  Social History   Social History     Tobacco Use    Smoking status: Former     Current packs/day: 0.25     Types: Cigarettes   Substance Use Topics    Alcohol use: Yes    Drug use: Yes     Types: Marijuana      A medically appropriate review of systems was performed with pertinent positives and negatives noted in the HPI, and all other systems negative.    Physical Exam   BP: 126/83  Pulse: 73  Temp: 97.9  F (36.6  C)  Resp: 18  SpO2: 98 %  Physical Exam  Vitals and nursing note reviewed.   HENT:      Head: Normocephalic.   Eyes:      Pupils: Pupils are equal, round, and reactive to light.   Pulmonary:      Effort: Pulmonary effort is normal.   Musculoskeletal:         General: Normal range of motion.      Cervical back: Normal range of motion.   Neurological:      General: No focal deficit present.      Mental Status: She is alert.   Psychiatric:         Attention and Perception: Attention and perception normal.         Mood and Affect: Mood normal.         Speech: Speech normal.          Behavior: Behavior normal. Behavior is not agitated, aggressive, hyperactive or combative. Behavior is cooperative.         Thought Content: Thought content normal. Thought content is not paranoid or delusional. Thought content does not include homicidal or suicidal ideation.         Cognition and Memory: Cognition and memory normal.         Judgment: Judgment normal.           ED Course, Procedures, & Data      Procedures       10 minutes spent reviewing prior records and intervention.     Results for orders placed or performed during the hospital encounter of 09/13/24   Urine Drug Screen     Status: None ()    Narrative    The following orders were created for panel order Urine Drug Screen.  Procedure                               Abnormality         Status                     ---------                               -----------         ------                     Urine Drug Screen Panel[[id number]]                                                       Please view results for these tests on the individual orders.     Medications   OLANZapine zydis (zyPREXA) ODT tab 5 mg (5 mg Oral Not Given 9/13/24 1604)     Labs Ordered and Resulted from Time of ED Arrival to Time of ED Departure - No data to display  No orders to display          Critical care was not performed.     Medical Decision Making  The patient's presentation was of moderate complexity (a chronic illness mild to moderate exacerbation, progression, or side effect of treatment).    The patient's evaluation involved:  review of external note(s) from 2 sources (see separate area of note for details)  review of 2 test result(s) ordered prior to this encounter (see separate area of note for details)  ordering and/or review of 2 test(s) in this encounter (see separate area of note for details)    The patient's management necessitated moderate risk (limitations due to social determinants of health (healthcare access difficulty and substance use)).    Assessment &  Plan    Patient is here accompanied by sister with concerns for overwhelming anxiety. Patient feels that nothing is helping. She is willing to try a test dose of olanzapine 5 mg. While she was waiting she admitted to the nurse that she actually took Xanax 0.5 mg this morning. She is bothered by the loud and triggering ED milieu and requests discharge. She reports plan to follow-up with her therapist tomorrow. She declined Zyprexa. She can discuss it on follow-up with her psychiatrist.    Patient can be discharged. I do not find her holdable. Sister plans on supporting her and is comfortable with her discharge.    I have reviewed the nursing notes. I have reviewed the findings, diagnosis, plan and need for follow up with the patient.    Discharge Medication List as of 9/13/2024  4:04 PM          Final diagnoses:   ILAN (generalized anxiety disorder)         [facility] EMERGENCY DEPARTMENT  9/13/2024     Edil Wiseman MD  09/13/24 [id number]

## 2025-03-10 ENCOUNTER — APPOINTMENT (OUTPATIENT)
Dept: URBAN - METROPOLITAN AREA CLINIC 256 | Age: 30
Setting detail: DERMATOLOGY
End: 2025-03-10

## 2025-03-10 VITALS — WEIGHT: 130 LBS | HEIGHT: 64 IN

## 2025-03-10 DIAGNOSIS — D49.2 NEOPLASM OF UNSPECIFIED BEHAVIOR OF BONE, SOFT TISSUE, AND SKIN: ICD-10-CM

## 2025-03-10 DIAGNOSIS — I78.1 NEVUS, NON-NEOPLASTIC: ICD-10-CM

## 2025-03-10 PROCEDURE — OTHER ADDITIONAL NOTES: OTHER

## 2025-03-10 PROCEDURE — OTHER MIPS QUALITY: OTHER

## 2025-03-10 PROCEDURE — OTHER COUNSELING: OTHER

## 2025-03-10 PROCEDURE — OTHER PHOTO-DOCUMENTATION: OTHER

## 2025-03-10 PROCEDURE — OTHER ELECTRODESICCATION (COSMETIC): OTHER

## 2025-03-10 ASSESSMENT — LOCATION DETAILED DESCRIPTION DERM: LOCATION DETAILED: NASAL SUPRATIP

## 2025-03-10 ASSESSMENT — LOCATION SIMPLE DESCRIPTION DERM: LOCATION SIMPLE: NOSE

## 2025-03-10 ASSESSMENT — LOCATION ZONE DERM: LOCATION ZONE: NOSE

## 2025-03-10 NOTE — HPI: SKIN LESION
What Type Of Note Output Would You Prefer (Optional)?: Standard Output
treated_been_treated
Is This A New Presentation, Or A Follow-Up?: Skin Lesion
Additional History: Followed a derm clinic in September where she was told it was a cherry Sherlyn an and was treated by burning.
When Was It Treated?: September 2024

## 2025-03-10 NOTE — PROCEDURE: ELECTRODESICCATION (COSMETIC)
Anesthesia Volume In Cc: 0.1
Anesthesia Type: 1% lidocaine with epinephrine and a 1:10 solution of 8.4% sodium bicarbonate
Consent: The patient's consent was obtained including but not limited to risks of crusting, scabbing, blistering, scarring, darker or lighter pigmentary change, recurrence, incomplete removal and infection.
Post-Care Instructions: I reviewed with the patient in detail post-care instructions. Patient is to wear sun protection and avoid picking at any of the treated lesions. Pt may apply Vaseline to crusted or scabbing areas.
Broderick: 5
Price (Use Numbers Only, No Special Characters Or $): 100
Detail Level: Zone

## 2025-03-10 NOTE — PROCEDURE: ADDITIONAL NOTES
Render Risk Assessment In Note?: no
Detail Level: Simple
Additional Notes: -Informed pt lesion is consistent with benign spider Angioma.\\n-Discussed treatment options of electrodessication with risks of scarring and higher chance of return vs VI beam treatment with  services which would bruise before likely fully healing over 1-3 treatments. \\n-Pt opted to repeat electrodessication on lesion.\\n-May f/u with cosmetic services for consult of VI beam procedure if lesion persists.

## 2025-07-13 ENCOUNTER — HEALTH MAINTENANCE LETTER (OUTPATIENT)
Age: 30
End: 2025-07-13